# Patient Record
Sex: FEMALE | Race: WHITE | NOT HISPANIC OR LATINO | Employment: PART TIME | URBAN - METROPOLITAN AREA
[De-identification: names, ages, dates, MRNs, and addresses within clinical notes are randomized per-mention and may not be internally consistent; named-entity substitution may affect disease eponyms.]

---

## 2020-10-08 ENCOUNTER — OFFICE VISIT (OUTPATIENT)
Dept: PRIMARY CARE CLINIC | Facility: CLINIC | Age: 35
End: 2020-10-08
Payer: MEDICAID

## 2020-10-08 VITALS
BODY MASS INDEX: 32.45 KG/M2 | OXYGEN SATURATION: 97 % | HEART RATE: 68 BPM | RESPIRATION RATE: 18 BRPM | DIASTOLIC BLOOD PRESSURE: 68 MMHG | TEMPERATURE: 98 F | SYSTOLIC BLOOD PRESSURE: 116 MMHG | WEIGHT: 171.88 LBS | HEIGHT: 61 IN
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
## 2020-10-08 PROCEDURE — 99999 PR PBB SHADOW E&M-EST. PATIENT-LVL III: CPT | Mod: PBBFAC,,, | Performed by: STUDENT IN AN ORGANIZED HEALTH CARE EDUCATION/TRAINING PROGRAM

## 2020-10-08 PROCEDURE — 99213 OFFICE O/P EST LOW 20 MIN: CPT | Mod: S$PBB,,, | Performed by: STUDENT IN AN ORGANIZED HEALTH CARE EDUCATION/TRAINING PROGRAM

## 2020-10-08 PROCEDURE — 99999 PR PBB SHADOW E&M-EST. PATIENT-LVL III: ICD-10-PCS | Mod: PBBFAC,,, | Performed by: STUDENT IN AN ORGANIZED HEALTH CARE EDUCATION/TRAINING PROGRAM

## 2020-10-08 PROCEDURE — 99213 PR OFFICE/OUTPT VISIT, EST, LEVL III, 20-29 MIN: ICD-10-PCS | Mod: S$PBB,,, | Performed by: STUDENT IN AN ORGANIZED HEALTH CARE EDUCATION/TRAINING PROGRAM

## 2020-10-08 PROCEDURE — 99213 OFFICE O/P EST LOW 20 MIN: CPT | Mod: PBBFAC,PN | Performed by: STUDENT IN AN ORGANIZED HEALTH CARE EDUCATION/TRAINING PROGRAM

## 2020-10-08 RX ORDER — HYDROCODONE BITARTRATE AND ACETAMINOPHEN 5; 325 MG/1; MG/1
1 TABLET ORAL EVERY 8 HOURS PRN
Qty: 20 TABLET | Refills: 0 | Status: SHIPPED | OUTPATIENT
Start: 2020-10-08 | End: 2020-11-06
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
## 2020-10-08 NOTE — PATIENT INSTRUCTIONS
Use Ibuprofen and heat as you have been for your lower back pain.  Your exam suggests some muscular strain and this should improve over the next 7-10 days.    I'm giving you a weeks worth of Norco to help with the more intense pain right now.  I'd also encourage you to preform the exercises I've printed for you.  Do these a couple times daily once your back is feeling well enough that they don't cause sharp pain.    If the pain starts getting worse, then you should return to clinic for further evaluation.

## 2020-11-06 ENCOUNTER — OFFICE VISIT (OUTPATIENT)
Dept: PRIMARY CARE CLINIC | Facility: CLINIC | Age: 35
End: 2020-11-06
Payer: MEDICAID

## 2020-11-06 VITALS
OXYGEN SATURATION: 97 % | HEIGHT: 61 IN | SYSTOLIC BLOOD PRESSURE: 112 MMHG | RESPIRATION RATE: 16 BRPM | HEART RATE: 77 BPM | BODY MASS INDEX: 32.34 KG/M2 | WEIGHT: 171.31 LBS | DIASTOLIC BLOOD PRESSURE: 70 MMHG
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
## 2020-11-06 DIAGNOSIS — M54.2 CERVICAL PAIN (NECK): ICD-10-CM

## 2020-11-06 DIAGNOSIS — E03.9 HYPOTHYROIDISM, UNSPECIFIED TYPE: ICD-10-CM

## 2020-11-06 DIAGNOSIS — Z00.00 HEALTH CARE MAINTENANCE: ICD-10-CM

## 2020-11-06 PROBLEM — E04.2 MULTINODULAR GOITER: Status: ACTIVE | Noted: 2018-10-09

## 2020-11-06 PROCEDURE — 99214 PR OFFICE/OUTPT VISIT, EST, LEVL IV, 30-39 MIN: ICD-10-PCS | Mod: S$PBB,,, | Performed by: STUDENT IN AN ORGANIZED HEALTH CARE EDUCATION/TRAINING PROGRAM

## 2020-11-06 PROCEDURE — 99214 OFFICE O/P EST MOD 30 MIN: CPT | Mod: S$PBB,,, | Performed by: STUDENT IN AN ORGANIZED HEALTH CARE EDUCATION/TRAINING PROGRAM

## 2020-11-06 PROCEDURE — 99999 PR PBB SHADOW E&M-EST. PATIENT-LVL IV: ICD-10-PCS | Mod: PBBFAC,,, | Performed by: STUDENT IN AN ORGANIZED HEALTH CARE EDUCATION/TRAINING PROGRAM

## 2020-11-06 PROCEDURE — 99214 OFFICE O/P EST MOD 30 MIN: CPT | Mod: PBBFAC,PN | Performed by: STUDENT IN AN ORGANIZED HEALTH CARE EDUCATION/TRAINING PROGRAM

## 2020-11-06 PROCEDURE — 99999 PR PBB SHADOW E&M-EST. PATIENT-LVL IV: CPT | Mod: PBBFAC,,, | Performed by: STUDENT IN AN ORGANIZED HEALTH CARE EDUCATION/TRAINING PROGRAM

## 2020-11-06 RX ORDER — MELOXICAM 7.5 MG/1
TABLET ORAL
Qty: 50 TABLET | Refills: 0 | Status: SHIPPED | OUTPATIENT
Start: 2020-11-06

## 2020-11-06 NOTE — PATIENT INSTRUCTIONS
Cervical Neck Pain: advise stretching and using Meloxicam 15mg daily for 1 week then 7.5-15 PRN for pain.               - getting a Xray of neck to evaluate

## 2020-11-23 ENCOUNTER — TELEPHONE (OUTPATIENT)
Dept: PRIMARY CARE CLINIC | Facility: CLINIC | Age: 35
End: 2020-11-23

## 2020-11-23 NOTE — TELEPHONE ENCOUNTER
----- Message from Daniela Wilder sent at 11/23/2020  2:04 PM CST -----  Contact: Patient, [phone]  Calling to get test results.  Name of test (lab, x-ray): [facility]  Date of test: 11/06/2020  Where was the test performed: [facility]  Comments: Please call her. Thanks.
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
## 2021-01-04 ENCOUNTER — PATIENT MESSAGE (OUTPATIENT)
Dept: ADMINISTRATIVE | Facility: HOSPITAL | Age: 36
End: 2021-01-04

## 2021-04-05 ENCOUNTER — PATIENT MESSAGE (OUTPATIENT)
Dept: ADMINISTRATIVE | Facility: HOSPITAL | Age: 36
End: 2021-04-05

## 2021-04-16 ENCOUNTER — PATIENT MESSAGE (OUTPATIENT)
Dept: RESEARCH | Facility: HOSPITAL | Age: 36
End: 2021-04-16

## 2021-06-28 ENCOUNTER — TELEPHONE (OUTPATIENT)
Dept: OBSTETRICS AND GYNECOLOGY | Facility: CLINIC | Age: 36
End: 2021-06-28

## 2021-07-06 ENCOUNTER — PATIENT MESSAGE (OUTPATIENT)
Dept: ADMINISTRATIVE | Facility: HOSPITAL | Age: 36
End: 2021-07-06

## 2021-08-09 ENCOUNTER — PATIENT OUTREACH (OUTPATIENT)
Dept: ADMINISTRATIVE | Facility: OTHER | Age: 36
End: 2021-08-09

## 2021-10-05 ENCOUNTER — PATIENT MESSAGE (OUTPATIENT)
Dept: ADMINISTRATIVE | Facility: HOSPITAL | Age: 36
End: 2021-10-05

## 2021-11-03 ENCOUNTER — TELEPHONE (OUTPATIENT)
Dept: OBSTETRICS AND GYNECOLOGY | Facility: CLINIC | Age: 36
End: 2021-11-03
Payer: MEDICAID

## 2021-11-08 ENCOUNTER — TELEPHONE (OUTPATIENT)
Dept: OBSTETRICS AND GYNECOLOGY | Facility: CLINIC | Age: 36
End: 2021-11-08
Payer: MEDICAID

## 2021-11-08 ENCOUNTER — PATIENT OUTREACH (OUTPATIENT)
Dept: ADMINISTRATIVE | Facility: OTHER | Age: 36
End: 2021-11-08
Payer: MEDICAID

## 2021-11-15 ENCOUNTER — TELEPHONE (OUTPATIENT)
Dept: OBSTETRICS AND GYNECOLOGY | Facility: CLINIC | Age: 36
End: 2021-11-15
Payer: MEDICAID

## 2021-11-16 ENCOUNTER — TELEPHONE (OUTPATIENT)
Dept: OBSTETRICS AND GYNECOLOGY | Facility: CLINIC | Age: 36
End: 2021-11-16
Payer: MEDICAID

## 2021-11-18 ENCOUNTER — OFFICE VISIT (OUTPATIENT)
Dept: OBSTETRICS AND GYNECOLOGY | Facility: CLINIC | Age: 36
End: 2021-11-18
Payer: MEDICAID

## 2021-11-18 VITALS
SYSTOLIC BLOOD PRESSURE: 104 MMHG | HEIGHT: 61 IN | DIASTOLIC BLOOD PRESSURE: 80 MMHG | WEIGHT: 177.25 LBS | BODY MASS INDEX: 33.47 KG/M2

## 2021-11-18 DIAGNOSIS — R39.15 URINARY URGENCY: ICD-10-CM

## 2021-11-18 DIAGNOSIS — N64.4 BREAST PAIN, LEFT: ICD-10-CM

## 2021-11-18 DIAGNOSIS — R63.5 WEIGHT GAIN: ICD-10-CM

## 2021-11-18 DIAGNOSIS — Z12.4 CERVICAL CANCER SCREENING: Primary | ICD-10-CM

## 2021-11-18 PROCEDURE — 87077 CULTURE AEROBIC IDENTIFY: CPT | Mod: 59 | Performed by: STUDENT IN AN ORGANIZED HEALTH CARE EDUCATION/TRAINING PROGRAM

## 2021-11-18 PROCEDURE — 99999 PR PBB SHADOW E&M-EST. PATIENT-LVL III: CPT | Mod: PBBFAC,,, | Performed by: STUDENT IN AN ORGANIZED HEALTH CARE EDUCATION/TRAINING PROGRAM

## 2021-11-18 PROCEDURE — 99385 PREV VISIT NEW AGE 18-39: CPT | Mod: S$PBB,,, | Performed by: STUDENT IN AN ORGANIZED HEALTH CARE EDUCATION/TRAINING PROGRAM

## 2021-11-18 PROCEDURE — 99213 OFFICE O/P EST LOW 20 MIN: CPT | Mod: PBBFAC,PN | Performed by: STUDENT IN AN ORGANIZED HEALTH CARE EDUCATION/TRAINING PROGRAM

## 2021-11-18 PROCEDURE — 87086 URINE CULTURE/COLONY COUNT: CPT | Performed by: STUDENT IN AN ORGANIZED HEALTH CARE EDUCATION/TRAINING PROGRAM

## 2021-11-18 PROCEDURE — 87624 HPV HI-RISK TYP POOLED RSLT: CPT | Performed by: STUDENT IN AN ORGANIZED HEALTH CARE EDUCATION/TRAINING PROGRAM

## 2021-11-18 PROCEDURE — 99385 PR PREVENTIVE VISIT,NEW,18-39: ICD-10-PCS | Mod: S$PBB,,, | Performed by: STUDENT IN AN ORGANIZED HEALTH CARE EDUCATION/TRAINING PROGRAM

## 2021-11-18 PROCEDURE — 87088 URINE BACTERIA CULTURE: CPT | Performed by: STUDENT IN AN ORGANIZED HEALTH CARE EDUCATION/TRAINING PROGRAM

## 2021-11-18 PROCEDURE — 99999 PR PBB SHADOW E&M-EST. PATIENT-LVL III: ICD-10-PCS | Mod: PBBFAC,,, | Performed by: STUDENT IN AN ORGANIZED HEALTH CARE EDUCATION/TRAINING PROGRAM

## 2021-11-18 PROCEDURE — 88175 CYTOPATH C/V AUTO FLUID REDO: CPT | Performed by: STUDENT IN AN ORGANIZED HEALTH CARE EDUCATION/TRAINING PROGRAM

## 2021-11-18 PROCEDURE — 87186 SC STD MICRODIL/AGAR DIL: CPT | Mod: 59 | Performed by: STUDENT IN AN ORGANIZED HEALTH CARE EDUCATION/TRAINING PROGRAM

## 2021-11-21 LAB
BACTERIA UR CULT: ABNORMAL
BACTERIA UR CULT: ABNORMAL

## 2021-11-22 ENCOUNTER — PATIENT MESSAGE (OUTPATIENT)
Dept: OBSTETRICS AND GYNECOLOGY | Facility: CLINIC | Age: 36
End: 2021-11-22
Payer: MEDICAID

## 2021-11-22 ENCOUNTER — TELEPHONE (OUTPATIENT)
Dept: OBSTETRICS AND GYNECOLOGY | Facility: CLINIC | Age: 36
End: 2021-11-22
Payer: MEDICAID

## 2021-11-22 RX ORDER — NITROFURANTOIN 25; 75 MG/1; MG/1
100 CAPSULE ORAL 2 TIMES DAILY
Qty: 14 CAPSULE | Refills: 0 | Status: SHIPPED | OUTPATIENT
Start: 2021-11-22 | End: 2021-11-29

## 2021-11-24 LAB
FINAL PATHOLOGIC DIAGNOSIS: NORMAL
Lab: NORMAL

## 2021-11-29 ENCOUNTER — PATIENT MESSAGE (OUTPATIENT)
Dept: OBSTETRICS AND GYNECOLOGY | Facility: CLINIC | Age: 36
End: 2021-11-29
Payer: MEDICAID

## 2021-11-29 LAB
HPV HR 12 DNA SPEC QL NAA+PROBE: NEGATIVE
HPV16 AG SPEC QL: NEGATIVE
HPV18 DNA SPEC QL NAA+PROBE: NEGATIVE

## 2021-12-10 ENCOUNTER — PATIENT MESSAGE (OUTPATIENT)
Dept: OBSTETRICS AND GYNECOLOGY | Facility: CLINIC | Age: 36
End: 2021-12-10
Payer: MEDICAID

## 2022-09-22 ENCOUNTER — PATIENT MESSAGE (OUTPATIENT)
Dept: OBSTETRICS AND GYNECOLOGY | Facility: CLINIC | Age: 37
End: 2022-09-22
Payer: MEDICAID

## 2022-11-05 ENCOUNTER — PATIENT MESSAGE (OUTPATIENT)
Dept: OBSTETRICS AND GYNECOLOGY | Facility: CLINIC | Age: 37
End: 2022-11-05
Payer: MEDICAID

## 2023-03-27 ENCOUNTER — TELEPHONE (OUTPATIENT)
Dept: OBSTETRICS AND GYNECOLOGY | Facility: CLINIC | Age: 38
End: 2023-03-27
Payer: MEDICAID

## 2023-03-27 DIAGNOSIS — R35.0 URINARY FREQUENCY: Primary | ICD-10-CM

## 2023-03-27 NOTE — TELEPHONE ENCOUNTER
Reached out to pt in regards to incorrectly scheduled appt. Pt vu, placed u/c order for pt since she is having UTI symptoms. Also scheduled pt f/u visit to discuss symptoms

## 2023-09-18 ENCOUNTER — PATIENT MESSAGE (OUTPATIENT)
Dept: PRIMARY CARE CLINIC | Facility: CLINIC | Age: 38
End: 2023-09-18
Payer: MEDICAID

## 2023-10-18 ENCOUNTER — PATIENT MESSAGE (OUTPATIENT)
Dept: CARDIOLOGY | Facility: CLINIC | Age: 38
End: 2023-10-18
Payer: MEDICAID

## 2024-05-17 ENCOUNTER — HOSPITAL ENCOUNTER (EMERGENCY)
Facility: HOSPITAL | Age: 39
Discharge: HOME | End: 2024-05-17
Attending: EMERGENCY MEDICINE
Payer: COMMERCIAL

## 2024-05-17 VITALS
HEIGHT: 61 IN | BODY MASS INDEX: 30.58 KG/M2 | DIASTOLIC BLOOD PRESSURE: 73 MMHG | RESPIRATION RATE: 20 BRPM | OXYGEN SATURATION: 100 % | WEIGHT: 162 LBS | SYSTOLIC BLOOD PRESSURE: 108 MMHG | TEMPERATURE: 98 F | HEART RATE: 77 BPM

## 2024-05-17 DIAGNOSIS — R55 NEAR SYNCOPE: ICD-10-CM

## 2024-05-17 DIAGNOSIS — N92.0 MENORRHAGIA WITH REGULAR CYCLE: ICD-10-CM

## 2024-05-17 DIAGNOSIS — N94.6 DYSMENORRHEA: Primary | ICD-10-CM

## 2024-05-17 LAB
ALBUMIN SERPL BCP-MCNC: 4.3 G/DL (ref 3.5–5.2)
ALP SERPL-CCNC: 58 U/L (ref 55–135)
ALT SERPL W/O P-5'-P-CCNC: 14 U/L (ref 10–44)
ANION GAP SERPL CALC-SCNC: 10 MMOL/L (ref 8–16)
AST SERPL-CCNC: 18 U/L (ref 10–40)
B-HCG UR QL: NEGATIVE
BACTERIA #/AREA URNS HPF: ABNORMAL /HPF
BASOPHILS # BLD AUTO: 0.03 K/UL (ref 0–0.2)
BASOPHILS NFR BLD: 0.6 % (ref 0–1.9)
BILIRUB SERPL-MCNC: 1.1 MG/DL (ref 0.1–1)
BILIRUB UR QL STRIP: NEGATIVE
BUN SERPL-MCNC: 7 MG/DL (ref 6–20)
CALCIUM SERPL-MCNC: 9.2 MG/DL (ref 8.7–10.5)
CHLORIDE SERPL-SCNC: 106 MMOL/L (ref 95–110)
CLARITY UR: CLEAR
CO2 SERPL-SCNC: 22 MMOL/L (ref 23–29)
COLOR UR: COLORLESS
CREAT SERPL-MCNC: 0.8 MG/DL (ref 0.5–1.4)
CTP QC/QA: YES
DIFFERENTIAL METHOD BLD: NORMAL
EOSINOPHIL # BLD AUTO: 0.1 K/UL (ref 0–0.5)
EOSINOPHIL NFR BLD: 2.1 % (ref 0–8)
ERYTHROCYTE [DISTWIDTH] IN BLOOD BY AUTOMATED COUNT: 12 % (ref 11.5–14.5)
EST. GFR  (NO RACE VARIABLE): >60 ML/MIN/1.73 M^2
GLUCOSE SERPL-MCNC: 90 MG/DL (ref 70–110)
GLUCOSE UR QL STRIP: NEGATIVE
HCT VFR BLD AUTO: 39.1 % (ref 37–48.5)
HGB BLD-MCNC: 13 G/DL (ref 12–16)
HGB UR QL STRIP: ABNORMAL
IMM GRANULOCYTES # BLD AUTO: 0 K/UL (ref 0–0.04)
IMM GRANULOCYTES NFR BLD AUTO: 0 % (ref 0–0.5)
KETONES UR QL STRIP: NEGATIVE
LEUKOCYTE ESTERASE UR QL STRIP: NEGATIVE
LYMPHOCYTES # BLD AUTO: 1.2 K/UL (ref 1–4.8)
LYMPHOCYTES NFR BLD: 23.6 % (ref 18–48)
MCH RBC QN AUTO: 30.4 PG (ref 27–31)
MCHC RBC AUTO-ENTMCNC: 33.2 G/DL (ref 32–36)
MCV RBC AUTO: 92 FL (ref 82–98)
MICROSCOPIC COMMENT: ABNORMAL
MONOCYTES # BLD AUTO: 0.4 K/UL (ref 0.3–1)
MONOCYTES NFR BLD: 7.6 % (ref 4–15)
NEUTROPHILS # BLD AUTO: 3.5 K/UL (ref 1.8–7.7)
NEUTROPHILS NFR BLD: 66.1 % (ref 38–73)
NITRITE UR QL STRIP: NEGATIVE
NRBC BLD-RTO: 0 /100 WBC
PH UR STRIP: 6 [PH] (ref 5–8)
PLATELET # BLD AUTO: 235 K/UL (ref 150–450)
PMV BLD AUTO: 9.4 FL (ref 9.2–12.9)
POTASSIUM SERPL-SCNC: 4.1 MMOL/L (ref 3.5–5.1)
PROT SERPL-MCNC: 7.4 G/DL (ref 6–8.4)
PROT UR QL STRIP: NEGATIVE
RBC # BLD AUTO: 4.27 M/UL (ref 4–5.4)
RBC #/AREA URNS HPF: 16 /HPF (ref 0–4)
SODIUM SERPL-SCNC: 138 MMOL/L (ref 136–145)
SP GR UR STRIP: <1.005 (ref 1–1.03)
SQUAMOUS #/AREA URNS HPF: 1 /HPF
URN SPEC COLLECT METH UR: ABNORMAL
UROBILINOGEN UR STRIP-ACNC: NEGATIVE EU/DL
WBC # BLD AUTO: 5.26 K/UL (ref 3.9–12.7)
WBC #/AREA URNS HPF: 2 /HPF (ref 0–5)

## 2024-05-17 PROCEDURE — 36415 COLL VENOUS BLD VENIPUNCTURE: CPT | Performed by: NURSE PRACTITIONER

## 2024-05-17 PROCEDURE — 81025 URINE PREGNANCY TEST: CPT | Performed by: NURSE PRACTITIONER

## 2024-05-17 PROCEDURE — 99283 EMERGENCY DEPT VISIT LOW MDM: CPT

## 2024-05-17 PROCEDURE — 85025 COMPLETE CBC W/AUTO DIFF WBC: CPT | Performed by: NURSE PRACTITIONER

## 2024-05-17 PROCEDURE — 80053 COMPREHEN METABOLIC PANEL: CPT | Performed by: NURSE PRACTITIONER

## 2024-05-17 PROCEDURE — 81000 URINALYSIS NONAUTO W/SCOPE: CPT | Performed by: NURSE PRACTITIONER

## 2024-05-17 RX ORDER — ONDANSETRON 4 MG/1
4 TABLET, ORALLY DISINTEGRATING ORAL EVERY 8 HOURS PRN
Qty: 15 TABLET | Refills: 0 | Status: SHIPPED | OUTPATIENT
Start: 2024-05-17

## 2024-05-17 NOTE — FIRST PROVIDER EVALUATION
Emergency Department TeleTriage Encounter Note      CHIEF COMPLAINT    Chief Complaint   Patient presents with    Vaginal Bleeding     Patient states she is having bleeding more during this cycle feels weak        VITAL SIGNS   Initial Vitals [05/17/24 1710]   BP Pulse Resp Temp SpO2   116/65 77 20 97.9 °F (36.6 °C) 100 %      MAP       --            ALLERGIES    Review of patient's allergies indicates:  No Known Allergies    PROVIDER TRIAGE NOTE  Verbal consent for the teletriage evaluation was given by the patient at the start of the evaluation.  All efforts will be made to maintain patient's privacy during the evaluation.      This is a teletriage evaluation of a 38 y.o. female presenting to the ED with c/o heavy vaginal bleeding that started today; felt like she was going to pass out. Two pads since noon.  Limited physical exam via telehealth: The patient is awake, alert, answering questions appropriately and is not in respiratory distress.  As the Teletriage provider, I performed an initial assessment and ordered appropriate labs and imaging studies, if any, to facilitate the patient's care once placed in the ED. Once a room is available, care and a full evaluation will be completed by an alternate ED provider.  Any additional orders and the final disposition will be determined by that provider.  All imaging and labs will not be followed-up by the Teletriage Team, including myself.          ORDERS  Labs Reviewed   CBC W/ AUTO DIFFERENTIAL   COMPREHENSIVE METABOLIC PANEL   URINALYSIS, REFLEX TO URINE CULTURE   POCT URINE PREGNANCY       ED Orders (720h ago, onward)      Start Ordered     Status Ordering Provider    05/17/24 1723 05/17/24 1722  Orthostatic blood pressure  Once         Ordered DOREEN VÁSQUEZ    05/17/24 1722 05/17/24 1721  Saline lock IV  Once         Ordered DOREEN VÁSQUEZ    05/17/24 1722 05/17/24 1721  CBC auto differential  STAT         Pending Collection DOREEN VÁSQUEZ    05/17/24 1722 05/17/24  1721  Comprehensive metabolic panel  STAT         Ordered DOREEN VÁSQUEZ    05/17/24 1722 05/17/24 1721  POCT urine pregnancy  Once         Ordered DOREEN VÁSQUEZ    05/17/24 1722 05/17/24 1721  Urinalysis, Reflex to Urine Culture Urine, Clean Catch  STAT         Ordered DOREEN VÁSQUEZ              Virtual Visit Note: The provider triage portion of this emergency department evaluation and documentation was performed via [employer], a HIPAA-compliant telemedicine application, in concert with a tele-presenter in the room. A face to face patient evaluation with one of my colleagues will occur once the patient is placed in an emergency department room.      DISCLAIMER: This note was prepared with [employer] voice recognition transcription software. Garbled syntax, mangled pronouns, and other bizarre constructions may be attributed to that software system.

## 2024-05-18 NOTE — ED PROVIDER NOTES
Encounter Date: 2024       History     Chief Complaint   Patient presents with    Vaginal Bleeding     Patient states she is having bleeding more during this cycle feels weak      Emergent evaluation of a 38-year-old female with history of anemia, tubal ligation, D and C of uterus in the past, 4 prior C-sections and anxiety presents to the ER due to heavier periods than usual.  Patient reports this has been having for the past several months her menstrual cycle began today was light this morning but at noon she went through 2 maxi pads within 30 minutes and that is gone through 1 further heavy Flomax the pad she also had symptoms of near-syncope feeling lightheaded dizzy diaphoretic nauseous and shaky and was concerned for anemia.  She did take Midol due to abdominal cramping and those symptoms resolved within an hour.  The patient was still was feeling unwell and was concerned for anemia so came to the ER she was scheduling an appointment with a gyn nurse practitioner on Monday  She also reports due to not feeling well did not eat or drank      Review of patient's allergies indicates:  No Known Allergies  Past Medical History:   Diagnosis Date    Anemia     Anxiety disorder, unspecified      Past Surgical History:   Procedure Laterality Date     SECTION      four    DILATION AND CURETTAGE OF UTERUS      TUBAL LIGATION      WISDOM TOOTH EXTRACTION       Family History   Problem Relation Name Age of Onset    Hypothyroidism Mother      COPD Mother      Hashimoto's thyroiditis Mother      Hyperlipidemia Father      Stroke Father      Heart disease Father      Diabetes Father      Hypothyroidism Maternal Grandmother       Social History     Tobacco Use    Smoking status: Former    Smokeless tobacco: Never   Substance Use Topics    Alcohol use: No    Drug use: No     Review of Systems   Constitutional:  Positive for activity change and appetite change. Negative for chills, diaphoresis, fatigue and fever.    Respiratory:  Negative for shortness of breath.    Cardiovascular:  Negative for chest pain.   Gastrointestinal:  Positive for abdominal pain and nausea. Negative for abdominal distention, blood in stool, constipation, diarrhea and vomiting.   Genitourinary:  Positive for menstrual problem, pelvic pain and vaginal bleeding. Negative for dysuria, flank pain, frequency, hematuria, urgency and vaginal discharge.   Musculoskeletal:  Negative for arthralgias, back pain, myalgias, neck pain and neck stiffness.   Skin:  Negative for rash.   Neurological:  Positive for dizziness, weakness and light-headedness. Negative for syncope and headaches.        Near-syncope   Hematological:  Does not bruise/bleed easily.   Psychiatric/Behavioral:  Negative for confusion. The patient is not nervous/anxious.    All other systems reviewed and are negative.      Physical Exam     Initial Vitals [05/17/24 1710]   BP Pulse Resp Temp SpO2   116/65 77 20 97.9 °F (36.6 °C) 100 %      MAP       --         Physical Exam    Nursing note and vitals reviewed.  Constitutional: She appears well-developed and well-nourished. She is not diaphoretic. No distress.   HENT:   Head: Normocephalic and atraumatic.   Right Ear: External ear normal.   Left Ear: External ear normal.   Nose: Nose normal.   Mouth/Throat: Oropharynx is clear and moist.   Eyes: Conjunctivae and EOM are normal. Pupils are equal, round, and reactive to light. No scleral icterus.   Neck: Neck supple. No tracheal deviation present.   Normal range of motion.  Cardiovascular:  Normal rate, regular rhythm, normal heart sounds and intact distal pulses.     Exam reveals no gallop and no friction rub.       No murmur heard.  108/73 pulse 77   Pulmonary/Chest: Breath sounds normal. No stridor. No respiratory distress. She has no wheezes. She has no rhonchi. She has no rales. She exhibits no tenderness.   100% on room air respirations 20 clear breath sounds bilaterally   Abdominal: Abdomen  is soft. Bowel sounds are normal. She exhibits no distension and no mass. There is no abdominal tenderness.   No tenderness There is no rebound and no guarding.   Musculoskeletal:         General: No edema. Normal range of motion.      Cervical back: Normal range of motion and neck supple.     Neurological: She is alert and oriented to person, place, and time. She has normal strength. No cranial nerve deficit or sensory deficit.   Skin: Skin is warm and dry. No rash noted. No erythema. No pallor.   Psychiatric: She has a normal mood and affect. Her behavior is normal. Judgment and thought content normal.         ED Course   Procedures  Labs Reviewed   COMPREHENSIVE METABOLIC PANEL - Abnormal; Notable for the following components:       Result Value    CO2 22 (*)     Total Bilirubin 1.1 (*)     All other components within normal limits   URINALYSIS, REFLEX TO URINE CULTURE - Abnormal; Notable for the following components:    Color, UA Colorless (*)     Specific Gravity, UA <1.005 (*)     Occult Blood UA 3+ (*)     All other components within normal limits    Narrative:     Specimen Source->Urine   URINALYSIS MICROSCOPIC - Abnormal; Notable for the following components:    RBC, UA 16 (*)     All other components within normal limits    Narrative:     Specimen Source->Urine   CBC W/ AUTO DIFFERENTIAL   POCT URINE PREGNANCY          Imaging Results    None          Medications - No data to display  Medical Decision Making  Emergent evaluation of a 38-year-old female with history of anemia, tubal ligation, D and C of uterus in the past, 4 prior C-sections and anxiety presents to the ER due to heavier periods than usual.  Patient reports this has been having for the past several months her menstrual cycle began today was light this morning but at noon she went through 2 maxi pads within 30 minutes and that is gone through 1 further heavy Flomax the pad she also had symptoms of near-syncope feeling lightheaded dizzy  diaphoretic nauseous and shaky and was concerned for anemia.  She did take Midol due to abdominal cramping and those symptoms resolved within an hour.  The patient was still was feeling unwell and was concerned for anemia so came to the ER she was scheduling an appointment with a gyn nurse practitioner on Monday  On physical exam patient is in no distress he was normal vital signs no pallor soft nontender abdomen normal cardiac and lung exam does not feel dizzy or lightheaded when he sits up in bed for pulmonary exam  MDM    Patient presents for emergent evaluation of acute menstrual cycle began today with heavier flow than usual and near-syncope that poses a threat to life and/or bodily function.   Differential diagnosis includes but was not limited to symptomatic anemia dysrhythmias ACS ovarian torsion hemorrhagic cyst ruptured cyst PID tubo-ovarian abscess dysmenorrhea menorrhagia uterine mass.   In the ED patient found to have acute dysmenorrhea menorrhagia.    I ordered labs and personally reviewed them.  Labs significant for see below.Discharge MDM     Patient was managed in the ED with no medications here will discharge home with Zofran for nausea discussed with the patient her labs on her my chart and showed her that the urine was contaminated with blood from her menstrual cycle, her bicarb was slightly low and bili was 1.1 H&H was normal she will follow up with gyn for further evaluation and treatment of heavy menstrual cycles with presyncopal symptoms  The response to treatment was good.    Patient was discharged in stable condition.  Detailed return precautions discussed.  Patient was told to follow up with primary care physician or specialist based on their diagnosis  Nilda Cedillo MD      Amount and/or Complexity of Data Reviewed  Labs: ordered.     Details: UPT negative  Normal CBC  CMP with bicarb of 22   bilirubin 1.1  Urine was colorless decreased specific gravity 3+ blood 16 red blood cells  otherwise normal                                      Clinical Impression:  Final diagnoses:  [N94.6] Dysmenorrhea (Primary)  [N92.0] Menorrhagia with regular cycle  [R55] Near syncope          ED Disposition Condition    Discharge Stable          ED Prescriptions       Medication Sig Dispense Start Date End Date Auth. Provider    ondansetron (ZOFRAN-ODT) 4 MG TbDL Take 1 tablet (4 mg total) by mouth every 8 (eight) hours as needed (Nausea and vomiting). 15 tablet 5/17/2024 -- Nilda Cedillo MD          Follow-up Information       Follow up With Specialties Details Why Contact Info Additional Information    OBGYN of your choice   Monday as scheduled      [facility] -  Emergency Medicine Go to  If symptoms worsen [address] [place] [address] [address] [place] [id number] 1st floor             Nilda Cedillo MD  05/17/24 3370

## 2025-05-16 ENCOUNTER — OFFICE VISIT (OUTPATIENT)
Dept: OBSTETRICS AND GYNECOLOGY | Facility: CLINIC | Age: 40
End: 2025-05-16
Payer: COMMERCIAL

## 2025-05-16 VITALS
WEIGHT: 174.69 LBS | BODY MASS INDEX: 32.98 KG/M2 | HEIGHT: 61 IN | DIASTOLIC BLOOD PRESSURE: 71 MMHG | SYSTOLIC BLOOD PRESSURE: 105 MMHG | HEART RATE: 83 BPM

## 2025-05-16 DIAGNOSIS — Z12.4 CERVICAL CANCER SCREENING: Primary | ICD-10-CM

## 2025-05-16 DIAGNOSIS — Z01.419 WELL WOMAN EXAM WITH ROUTINE GYNECOLOGICAL EXAM: ICD-10-CM

## 2025-05-16 DIAGNOSIS — Z12.31 ENCOUNTER FOR SCREENING MAMMOGRAM FOR MALIGNANT NEOPLASM OF BREAST: ICD-10-CM

## 2025-05-16 DIAGNOSIS — E89.41 HOT FLASHES DUE TO SURGICAL MENOPAUSE: ICD-10-CM

## 2025-05-16 DIAGNOSIS — N64.4 MASTALGIA: ICD-10-CM

## 2025-05-16 PROCEDURE — 99999 PR PBB SHADOW E&M-EST. PATIENT-LVL V: CPT | Mod: PBBFAC,,,

## 2025-05-16 RX ORDER — AZITHROMYCIN 250 MG/1
TABLET, FILM COATED ORAL
COMMUNITY

## 2025-05-16 RX ORDER — TRAMADOL HYDROCHLORIDE 50 MG/1
TABLET, FILM COATED ORAL
COMMUNITY

## 2025-05-16 RX ORDER — DIPHENOXYLATE HYDROCHLORIDE AND ATROPINE SULFATE 2.5; .025 MG/1; MG/1
TABLET ORAL
COMMUNITY

## 2025-05-16 RX ORDER — CYCLOBENZAPRINE HCL 5 MG
5-10 TABLET ORAL NIGHTLY PRN
COMMUNITY
Start: 2025-04-24

## 2025-05-16 RX ORDER — DOXYCYCLINE 100 MG/1
CAPSULE ORAL
COMMUNITY

## 2025-05-16 RX ORDER — OSELTAMIVIR PHOSPHATE 75 MG/1
CAPSULE ORAL
COMMUNITY

## 2025-05-16 RX ORDER — AZELASTINE 1 MG/ML
SPRAY, METERED NASAL
COMMUNITY

## 2025-05-16 RX ORDER — HYDROCODONE BITARTRATE AND ACETAMINOPHEN 5; 325 MG/1; MG/1
1 TABLET ORAL 4 TIMES DAILY
COMMUNITY
Start: 2025-04-01

## 2025-05-16 RX ORDER — NITROFURANTOIN 25; 75 MG/1; MG/1
CAPSULE ORAL
COMMUNITY

## 2025-05-16 RX ORDER — CETIRIZINE HYDROCHLORIDE 10 MG/1
TABLET ORAL
COMMUNITY

## 2025-05-16 RX ORDER — METHOCARBAMOL 750 MG/1
TABLET, FILM COATED ORAL
COMMUNITY

## 2025-05-16 RX ORDER — PROMETHAZINE HYDROCHLORIDE AND DEXTROMETHORPHAN HYDROBROMIDE 6.25; 15 MG/5ML; MG/5ML
SYRUP ORAL
COMMUNITY

## 2025-05-16 RX ORDER — BUSPIRONE HYDROCHLORIDE 5 MG/1
5 TABLET ORAL
COMMUNITY
Start: 2025-04-24

## 2025-05-16 RX ORDER — FLUCONAZOLE 150 MG/1
TABLET ORAL
COMMUNITY

## 2025-05-16 RX ORDER — IVERMECTIN/METRONIDAZOLE/NIACI 1 %-1 %-4%
GEL (GRAM) TOPICAL
COMMUNITY
Start: 2025-04-08

## 2025-05-16 RX ORDER — PREDNISONE 20 MG/1
TABLET ORAL
COMMUNITY

## 2025-05-16 RX ORDER — METHOCARBAMOL 500 MG/1
TABLET, FILM COATED ORAL
COMMUNITY

## 2025-05-16 RX ORDER — DEXTROAMPHETAMINE SACCHARATE, AMPHETAMINE ASPARTATE, DEXTROAMPHETAMINE SULFATE AND AMPHETAMINE SULFATE 1.25; 1.25; 1.25; 1.25 MG/1; MG/1; MG/1; MG/1
TABLET ORAL
COMMUNITY

## 2025-05-16 RX ORDER — LACTULOSE 10 G/15ML
10 SOLUTION ORAL 2 TIMES DAILY PRN
COMMUNITY
Start: 2025-04-24

## 2025-05-16 RX ORDER — PROMETHAZINE HYDROCHLORIDE 25 MG/1
TABLET ORAL
COMMUNITY

## 2025-05-16 RX ORDER — MELOXICAM 15 MG/1
15 TABLET ORAL
COMMUNITY
Start: 2025-04-24

## 2025-05-16 RX ORDER — FLUTICASONE PROPIONATE 50 MCG
SPRAY, SUSPENSION (ML) NASAL
COMMUNITY

## 2025-05-16 RX ORDER — DEXTROAMPHETAMINE SACCHARATE, AMPHETAMINE ASPARTATE MONOHYDRATE, DEXTROAMPHETAMINE SULFATE AND AMPHETAMINE SULFATE 2.5; 2.5; 2.5; 2.5 MG/1; MG/1; MG/1; MG/1
CAPSULE, EXTENDED RELEASE ORAL EVERY MORNING
COMMUNITY
Start: 2025-04-24

## 2025-05-16 RX ORDER — METHYLPREDNISOLONE 4 MG/1
TABLET ORAL
COMMUNITY

## 2025-05-16 RX ORDER — FLUOXETINE 20 MG/1
1 CAPSULE ORAL DAILY
COMMUNITY

## 2025-05-16 RX ORDER — BUPROPION HYDROCHLORIDE 150 MG/1
1 TABLET ORAL DAILY
COMMUNITY

## 2025-05-16 RX ORDER — ALBUTEROL SULFATE 90 UG/1
INHALANT RESPIRATORY (INHALATION)
COMMUNITY

## 2025-05-16 NOTE — PROGRESS NOTES
CC: Annual / Well Woman Exam    HPI:   Pt is a 39 y.o.  female who presents for routine annual exam. She uses nothing for contraception. She does not want STD screening today.   She is sexually active with 1 male partner.   Reports menstrual cycles occur monthly and lasts for 5-7 days intermittently. Reports menstrual flow as moderately and cramping as slightly painful.   denies pain with sex, changes in/pain with bowel/bladder habits, changes in appetite, or headaches. She reports she is safe at home.  denies abnormal vaginal bleeding, vaginal dryness, abnormal vaginal discharge, vaginal itching/irritation, vaginal odor, burning with urination, or urinary frequency.   Reports sex drive has suddenly increased for last year.  Reports bilateral breast pain for last few months and concerned may have a lump/bump but unable to palpate due to pain.     LMP: Patient's last menstrual period was 2025 (exact date).     Past Medical History:   Diagnosis Date    Anemia     Anxiety disorder, unspecified      Past Surgical History:   Procedure Laterality Date     SECTION      four    DILATION AND CURETTAGE OF UTERUS      TUBAL LIGATION      WISDOM TOOTH EXTRACTION       Current Medications[1]    Review of patient's allergies indicates:   Allergen Reactions    Codeine Other (See Comments)      FH:   Breast cancer: none  Colon cancer: none  Ovarian cancer: none  Uterine cancer: none    HPV vaccine: 0/3 doses  COVID vaccine: Never    Last pap smear:   NILM  /  HPV -  History of abnormal pap smears: Unsure  Colonoscopy: Never  DEXA: Never  Mammogram: Never - will order first one today.  STD history: Never  Birth control: Nothing  OB history:    Tobacco use: Former.     ROS:  GENERAL: Feeling well overall. Denies fever or chills.   SKIN: Denies rash or lesions.   HEAD: Denies head injury or headache.   NODES: Denies enlarged lymph nodes.   CHEST: Denies chest pain or shortness of breath.    CARDIOVASCULAR: Denies palpitations or left sided chest pain.   ABDOMEN: No abdominal pain, constipation, diarrhea, nausea, vomiting or rectal bleeding.   URINARY: No dysuria, hematuria, or burning on urination.  REPRODUCTIVE: See HPI.   BREASTS: Denies pain, lumps, or nipple discharge.   HEMATOLOGIC: No easy bruisability or excessive bleeding.   MUSCULOSKELETAL: Denies joint pain or swelling.   NEUROLOGIC: Denies syncope or weakness.   PSYCHIATRIC: Denies depression, anxiety or mood swings.    PE: Female chaperone present for exam  APPEARANCE: Well nourished, well developed, female in no acute distress.  NODES: no cervical, supraclavicular, or inguinal lymphadenopathy  BREASTS: Symmetrical, no skin changes or visible lesions. No palpable masses, nipple discharge or adenopathy bilaterally.  ABDOMEN: Soft. No tenderness or masses. No distention. No hernias palpated.   VULVA: No lesions. Normal external female genitalia.  LABIA:         RIGHT: No rash, tenderness or lesion.         LEFT: No rash, tenderness or lesion.   URETHRA: No masses, tenderness, or prolapse.  VAGINA: Normal. No vaginal discharge, tenderness or bleeding.   UTERUS:  Normal in size/position. Non-tender and mobile on exam.   CERVIX: Moist. No rashes/lesions, abnormal discharge. Non-tender on exam.  ADNEXA: Normal in size. No masses tenderness on palpation.  ANUS/PERINEUM: Normal. No rashes, lesions on exam.    Diagnosis:  1. Well woman exam with routine gynecological exam    2. Cervical cancer screening    3. Encounter for screening mammogram for malignant neoplasm of breast    4. Hot flashes due to surgical menopause    5. Mastalgia      Plan:   Orders Placed This Encounter    US Breast Bilateral Limited    Mammo Digital Diagnostic Bilat with Adrien (XPD)    Estradiol    Follicle Stimulating Hormone    Testosterone,Total    DHEA-Sulfate    TSH    Prolactin    POCT Urine Pregnancy    Liquid-Based Pap Smear, Screening     - Pap w/ HPV co-test  collected.  - Diagnostic mammogram for bilateral beast pain for last few months.  - POCT UPT collected.  - Hormone labs ordered  - TSH ordered  - Prolactin level.  - Discussed contraception and she is not interested at this time.     Patient was counseled today on the new ACS guidelines for cervical cytology screening as well as the current recommendations for breast cancer screening. She was counseled to follow up with her PCP for other routine health maintenance. Counseling session lasted approximately 10 minutes, and all her questions were answered.  For women over the age of 65, you can stop having cervical cancer screenings if you have never had abnormal cervical cells or cervical cancer, and youve had three negative Pap tests in a row. (You also can stop screening if youve had two negative Pap and HPV tests in a row in the past 10 years, with at least one test in the past 5 years.)    Follow-up with me in 1 year for routine well woman exam; pap in 3 years.        KENNY Yee  [facility]          [1]   Current Outpatient Medications   Medication Sig Dispense Refill    AVEIDAOXIA 1-1-4 % Gel SMARTSI sparingly Topical Every Night      busPIRone (BUSPAR) 5 MG Tab Take 5 mg by mouth.      CONSTULOSE 10 gram/15 mL solution Take 10 mLs by mouth 2 (two) times daily as needed.      cyclobenzaprine (FLEXERIL) 5 MG tablet Take 5-10 mg by mouth nightly as needed.      dextroamphetamine-amphetamine (ADDERALL XR) 10 MG 24 hr capsule Take by mouth every morning.      HYDROcodone-acetaminophen (NORCO) 5-325 mg per tablet Take 1 tablet by mouth 4 (four) times daily.      meloxicam (MOBIC) 15 MG tablet Take 15 mg by mouth.      albuterol (PROVENTIL/VENTOLIN HFA) 90 mcg/actuation inhaler albuterol sulfate HFA 90 mcg/actuation aerosol inhaler   INHALE TWO PUFFS BY MOUTH EVERY 4 HOURS AS NEEDED      azelastine (ASTELIN) 137 mcg (0.1 %) nasal spray azelastine 137 mcg (0.1 %) nasal spray aerosol   USE 1 SPRAY(S)  IN EACH NOSTRIL TWICE DAILY AS NEEDED      azithromycin (Z-JAKI) 250 MG tablet azithromycin 250 mg tablet      buPROPion (WELLBUTRIN XL) 150 MG TB24 tablet Take 1 tablet by mouth once daily.      cetirizine (ZYRTEC) 10 MG tablet cetirizine 10 mg tablet   TAKE 1 TABLET BY MOUTH ONCE DAILY      chlorpheniramine-pseudoephed (LOHIST - D) 2-30 mg/5 mL Liqd LoHist - D 2 mg-30 mg/5 mL oral liquid   TAKE 10 ML BY MOUTH 4 TIMES DAILY AS NEEDED FOR COUGH AND FOR CONGESTION      dextroamphetamine-amphetamine (ADDERALL) 5 mg Tab Adderall      diazePAM (VALIUM) 5 MG tablet Take 1 tablet (5 mg total) by mouth every 6 (six) hours as needed (severe pain from muscle spasm). 10 tablet 0    diphenoxylate-atropine 2.5-0.025 mg (LOMOTIL) 2.5-0.025 mg per tablet diphenoxylate-atropine 2.5 mg-0.025 mg tablet      doxycycline (VIBRAMYCIN) 100 MG Cap Oral for 30 Days      fluconazole (DIFLUCAN) 150 MG Tab fluconazole 150 mg tablet   TAKE 1 TABLET BY MOUTH ONCE DAILY AS NEEDED      FLUoxetine 20 MG capsule Take 1 capsule by mouth once daily.      fluticasone propionate (FLONASE) 50 mcg/actuation nasal spray 1 spray (50 mcg total) by Each Nostril route 2 (two) times daily as needed for Rhinitis. 15 g 0    fluticasone propionate (FLONASE) 50 mcg/actuation nasal spray fluticasone propionate 50 mcg/actuation nasal spray,suspension   USE 1 SPRAY(S) IN EACH NOSTRIL TWICE DAILY AS NEEDED      loratadine (CLARITIN) 10 mg tablet Take 1 tablet (10 mg total) by mouth once daily. 60 tablet 0    methocarbamoL (ROBAXIN) 500 MG Tab TAKE ONE TABLET BY MOUTH THREE TIMES DAILY FOR 10 DAYS      methocarbamoL (ROBAXIN) 750 MG Tab TAKE ONE TABLET BY MOUTH TWICE DAILY Oral for 14 Days      methylPREDNISolone (MEDROL DOSEPACK) 4 mg tablet TAKE BY MOUTH AS DIRECTED ON INSIDE OF PACKAGE Oral for 6 Days      mupirocin (BACTROBAN) 2 % ointment Apply topically 3 (three) times daily. APPLY TO AREA BID 30 g 0    nitrofurantoin, macrocrystal-monohydrate, (MACROBID) 100 MG  capsule nitrofurantoin monohydrate/macrocrystals 100 mg capsule      ondansetron (ZOFRAN-ODT) 4 MG TbDL Take 1 tablet (4 mg total) by mouth every 8 (eight) hours as needed (Nausea and vomiting). 15 tablet 0    oseltamivir (TAMIFLU) 75 MG capsule oseltamivir 75 mg capsule   TAKE ONE CAPSULE BY MOUTH TWICE DAILY FOR FIVE DAYS      predniSONE (DELTASONE) 20 MG tablet prednisone 20 mg tablet   TAKE 1 TABLET BY MOUTH ONCE DAILY FOR 5 DAYS      promethazine (PHENERGAN) 25 MG tablet promethazine 25 mg tablet      promethazine-dextromethorphan (PROMETHAZINE-DM) 6.25-15 mg/5 mL Syrp promethazine-DM 6.25 mg-15 mg/5 mL oral syrup   TAKE 5ml BY MOUTH EVERY 4 TO 6 HOURS AS NEEDED      traMADoL (ULTRAM) 50 mg tablet TAKE ONE TABLET BY MOUTH THREE TIMES DAILY AS NEEDED FOR PAIN Oral for 7 Days       No current facility-administered medications for this visit.

## 2025-05-21 ENCOUNTER — RESULTS FOLLOW-UP (OUTPATIENT)
Dept: OBSTETRICS AND GYNECOLOGY | Facility: CLINIC | Age: 40
End: 2025-05-21

## 2025-06-02 DIAGNOSIS — E89.41 HOT FLASHES DUE TO SURGICAL MENOPAUSE: Primary | ICD-10-CM

## 2025-07-24 ENCOUNTER — OFFICE VISIT (OUTPATIENT)
Dept: PRIMARY CARE CLINIC | Facility: CLINIC | Age: 40
End: 2025-07-24
Payer: COMMERCIAL

## 2025-07-24 VITALS
SYSTOLIC BLOOD PRESSURE: 114 MMHG | RESPIRATION RATE: 18 BRPM | HEIGHT: 61 IN | OXYGEN SATURATION: 99 % | BODY MASS INDEX: 32.97 KG/M2 | DIASTOLIC BLOOD PRESSURE: 70 MMHG | WEIGHT: 174.63 LBS | HEART RATE: 93 BPM

## 2025-07-24 DIAGNOSIS — K58.1 IRRITABLE BOWEL SYNDROME WITH CONSTIPATION: ICD-10-CM

## 2025-07-24 DIAGNOSIS — F41.9 ANXIETY: Primary | ICD-10-CM

## 2025-07-24 DIAGNOSIS — F41.0 PANIC ATTACK: ICD-10-CM

## 2025-07-24 PROCEDURE — 1160F RVW MEDS BY RX/DR IN RCRD: CPT | Mod: CPTII,S$GLB,, | Performed by: STUDENT IN AN ORGANIZED HEALTH CARE EDUCATION/TRAINING PROGRAM

## 2025-07-24 PROCEDURE — 3008F BODY MASS INDEX DOCD: CPT | Mod: CPTII,S$GLB,, | Performed by: STUDENT IN AN ORGANIZED HEALTH CARE EDUCATION/TRAINING PROGRAM

## 2025-07-24 PROCEDURE — 99203 OFFICE O/P NEW LOW 30 MIN: CPT | Mod: S$GLB,,, | Performed by: STUDENT IN AN ORGANIZED HEALTH CARE EDUCATION/TRAINING PROGRAM

## 2025-07-24 PROCEDURE — 3078F DIAST BP <80 MM HG: CPT | Mod: CPTII,S$GLB,, | Performed by: STUDENT IN AN ORGANIZED HEALTH CARE EDUCATION/TRAINING PROGRAM

## 2025-07-24 PROCEDURE — 99999 PR PBB SHADOW E&M-EST. PATIENT-LVL IV: CPT | Mod: PBBFAC,,, | Performed by: STUDENT IN AN ORGANIZED HEALTH CARE EDUCATION/TRAINING PROGRAM

## 2025-07-24 PROCEDURE — 3074F SYST BP LT 130 MM HG: CPT | Mod: CPTII,S$GLB,, | Performed by: STUDENT IN AN ORGANIZED HEALTH CARE EDUCATION/TRAINING PROGRAM

## 2025-07-24 PROCEDURE — 1159F MED LIST DOCD IN RCRD: CPT | Mod: CPTII,S$GLB,, | Performed by: STUDENT IN AN ORGANIZED HEALTH CARE EDUCATION/TRAINING PROGRAM

## 2025-07-24 RX ORDER — LORAZEPAM 0.5 MG/1
0.5 TABLET ORAL EVERY 6 HOURS PRN
Qty: 12 TABLET | Refills: 0 | Status: SHIPPED | OUTPATIENT
Start: 2025-07-24 | End: 2025-08-23

## 2025-07-24 RX ORDER — BUSPIRONE HYDROCHLORIDE 10 MG/1
TABLET ORAL
Qty: 60 TABLET | Refills: 1 | Status: SHIPPED | OUTPATIENT
Start: 2025-07-24

## 2025-07-24 NOTE — PROGRESS NOTES
"Subjective:       Patient ID: Venus Yanez is a 40 y.o. female.    Chief Complaint: Anxiety and stomach issues    HPI:  40 y.o. female presents to [facility] with complaints of anxiety and abdominal concerns.    Patient reports following with Gynecology. Had recent hormonal testing and DHEA was low.    Patient reports anxiety. States was recently Rx Buspar from previous PCP and hasn't taken yet.    Recent stressors?:  had to get a second job and present less. Needing to perform more home chores. Daughter is 15 and some family conflict, in counseling.  also with anxiety concerns.  Exercise?: Walks daily, just started riding bikes.  Meditation?: Yes, Quaker  Good local support?: Yes  Therapist?: Signed up for therapist, can see free at work  Treated in past?: Yes  Failed trials?: Xanax (felt bad)  Manic symptoms when explained?: No    Has history of IBS. Feels that she is having difficulty with recent anxiety. Primary constipation.    Currently on Vyvanse or Adderall?: No, Vyvanse had helped in past for ADD. Not feeling needed now.    Doesn't drink alcohol. Rare    Tries to drink at least 100 oz of water daily.    7/1/2025 had normal TSH    Review of Systems   Constitutional:  Negative for chills, diaphoresis, fatigue and fever.   Respiratory:  Negative for chest tightness and shortness of breath.    Cardiovascular:  Negative for chest pain and palpitations.   Gastrointestinal:  Positive for constipation. Negative for abdominal pain, nausea and vomiting.   Skin:  Negative for rash and wound.   Psychiatric/Behavioral:  Positive for decreased concentration. Negative for dysphoric mood, hallucinations, sleep disturbance and suicidal ideas. The patient is nervous/anxious and is hyperactive.        Objective:      Vitals:    07/24/25 0841   BP: 114/70   BP Location: Right arm   Patient Position: Sitting   Pulse: 93   Resp: 18   SpO2: 99%   Weight: 79.2 kg (174 lb 9.7 oz)   Height: 5' 1" (1.549 m) " "    Physical Exam  Vitals reviewed.   Constitutional:       General: She is not in acute distress.     Appearance: Normal appearance. She is not ill-appearing.   HENT:      Head: Normocephalic and atraumatic.   Eyes:      General:         Right eye: No discharge.         Left eye: No discharge.      Conjunctiva/sclera: Conjunctivae normal.   Cardiovascular:      Rate and Rhythm: Normal rate.   Pulmonary:      Effort: Pulmonary effort is normal.   Musculoskeletal:         General: No deformity.   Skin:     Coloration: Skin is not jaundiced or pale.   Neurological:      General: No focal deficit present.      Mental Status: She is alert and oriented to person, place, and time.   Psychiatric:         Mood and Affect: Mood normal.         Behavior: Behavior normal.             Lab Results   Component Value Date     05/17/2024    K 4.1 05/17/2024     05/17/2024    CO2 22 (L) 05/17/2024    BUN 7 05/17/2024    CREATININE 0.8 05/17/2024    ANIONGAP 10 05/17/2024     No results found for: "HGBA1C"  Lab Results   Component Value Date    BNP 14 11/05/2022    BNP 45 01/28/2021       Lab Results   Component Value Date    WBC 5.26 05/17/2024    HGB 13.0 05/17/2024    HCT 39.1 05/17/2024     05/17/2024    GRAN 3.5 05/17/2024    GRAN 66.1 05/17/2024     No results found for: "CHOL", "HDL", "LDLCALC", "TRIG"     Current Medications[1]        Assessment:       1. Anxiety    2. Panic attack    3. Irritable bowel syndrome with constipation           Plan:       Anxiety  Panic attack  Irritable bowel syndrome with constipation  -     LORazepam (ATIVAN) 0.5 MG tablet; Take 1 tablet (0.5 mg total) by mouth every 6 (six) hours as needed for Anxiety. Not intended for daily use. Do not drive or perform dangerous tasks while taking.  Dispense: 12 tablet; Refill: 0  -     busPIRone (BUSPAR) 10 MG tablet; Take 1/2 tablet (5 mg) three times daily for first week, then 1 tablet twice daily thereafter.  Dispense: 60 tablet; " Refill: 1  - Conservative measures discussed today. Recommend routine exercise, daily meditation, and breathing exercises.  - F/u in 4 weeks for establish care visit    RTC in          [1]   Current Outpatient Medications:     busPIRone (BUSPAR) 10 MG tablet, Take 1/2 tablet (5 mg) three times daily for first week, then 1 tablet twice daily thereafter., Disp: 60 tablet, Rfl: 1    LORazepam (ATIVAN) 0.5 MG tablet, Take 1 tablet (0.5 mg total) by mouth every 6 (six) hours as needed for Anxiety. Not intended for daily use. Do not drive or perform dangerous tasks while taking., Disp: 12 tablet, Rfl: 0